# Patient Record
Sex: FEMALE | URBAN - METROPOLITAN AREA
[De-identification: names, ages, dates, MRNs, and addresses within clinical notes are randomized per-mention and may not be internally consistent; named-entity substitution may affect disease eponyms.]

---

## 2023-03-26 ENCOUNTER — ATHLETIC TRAINING (OUTPATIENT)
Dept: SPORTS MEDICINE | Facility: OTHER | Age: 18
End: 2023-03-26

## 2023-03-26 DIAGNOSIS — S76.012A STRAIN OF GLUTEUS MEDIUS, LEFT, INITIAL ENCOUNTER: ICD-10-CM

## 2023-03-26 DIAGNOSIS — M25.552 LEFT HIP PAIN: Primary | ICD-10-CM

## 2023-03-27 ENCOUNTER — ATHLETIC TRAINING (OUTPATIENT)
Dept: SPORTS MEDICINE | Facility: OTHER | Age: 18
End: 2023-03-27

## 2023-03-27 DIAGNOSIS — M25.552 LEFT HIP PAIN: Primary | ICD-10-CM

## 2023-03-27 DIAGNOSIS — S76.012A STRAIN OF GLUTEUS MEDIUS, LEFT, INITIAL ENCOUNTER: ICD-10-CM

## 2023-03-28 NOTE — PROGRESS NOTES
Athletic Training Hip/Thigh Evaluation     Name: Nilsa Moreira  Age: 21 y o    School District: [employer]  Sport: [employer]  Date of Assessment: 3/26/2023     Assessment/Plan:      Visit Diagnosis: Left hip pain [M25 552]     Treatment Plan: Decrease Pain, Strengthen    []  Follow-up PRN  []  Follow-up prior to next practice/game for re-evaluation  [x]  Daily treatment/rehab  Progress note expected weekly  Referral:      [x]  Not needed at this time  []  Referred to:      [x]  Coaching staff notified  []  Parent/Guardian Notified      Subjective:     Date of Injury: 3/26/23     Injury occurred during:      [x]  Practice  []  Competition  []  Other:      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  Pt stated during the beginning of todays practice she went for a shot and felt a sharp pain in the lateral aspect of their hip at the end range of internal rotation      Previous History: No pmhx     Reported Symptoms:      [] Pain with rest [] Pressure   [x] Pain with activity [] Burning   [] Pain with stairs [] Weakness   [x] Sharp pain [] Loss of motion   [] Dull pain [] Clicking   [] Felt pop [] Snapping sensation   [] Felt give way [] Radiating pain   [] Grinding          Objective:    Observation:      [x]  No observable findings compared bilaterally     [] Swelling [] Genu recurvatum   [] Deformity [] Genu valgum   [] Ecchymosis [] Genu varus   [] Abnormal gait [] Hip anteversion   [] Atrophy [] Hip retroversion   [] Muscle spasm [] Patella abnormality   [] Spine curvature          Palpation: No TTP     Active Range of Motion:        Full  ROM Limited  ROM Pain  with  ROM No  Motion   Hip Flexion  [x] [] [] []   Hip Extension [x] [] [] []   Hip Abduction [x] [] [x] []   Hip Adduction [x] [] [] []   Hip Internal Rotation [x] [] [x] []   Hip External Rotation [x] [] [x] []   Knee Flexion [x] [] [] []   Knee Extension [x] [] [] []      Manual Muscle Tests:      Not performed [] 5 4+ 4 4- 3 or  Under   Hip Flexion  [x] [] [] [] []   Hip Extension [x] [] [] [] []   Hip Abduction [x] [] [] [] []   Hip Adduction [x] [] [] [] []   Hip Internal Rotation [x] [] [] [] []   Hip External Rotation [x] [] [] [] []   Knee Flexion [x] [] [] [] []   Knee Extension [x] [] [] [] []      Special Tests:        (+)  Tightness (+)  Pain (-)  WNL Not  Tested   Fulcrum [] [] [] [x]   Ely’s [] [] [] [x]   Mary Kate Otero [] [] [] [x]   Donovan (Modified Mary Kate Otero) [] [] [] [x]   Catherine Hernandez []  [] [x] []   Piriformis [] [] [] [x]   RANDAL [] [] [] [x]   FADIR [] [] [] [x]   SI Compression/Distraction [] [] [] [x]     (+)  Clicking (+)  Pain (-)  WNL Not  Tested   Hip Scour [] [] [x] []     (+)  POS   (-)  WNL Not  Tested   Long Sit Test [] Leg  Discrepancy [] [x]   Trendelenberg's  [] Pelvic  Drop [] [x]       Treatment Log:     Date:     Playing Status:           Exercise/Treatment

## 2023-03-28 NOTE — PROGRESS NOTES
Athletic Training Progress Note    Name: Barbara May  Age: 21 y o  Assessment/Plan:     Visit Diagnosis: Left hip pain [M25 552]    Treatment Plan: Strengthen, decrease pain    []  Follow-up PRN  []  Follow-up prior to next practice/game for re-evaluation  [x]  Daily treatment/rehab  Progress note expected weekly  Subjective: Pt reported to the [facility] today to cont treatment for their left hip  Pt stated it feels a bit better than the day prior  Pt stated they only feel the discomfort at end range of motion      Objective:   See treatment log below    Treatment Log:     Date: 3/27/23       Playing Status: Full Go               Exercise/Treatment        Hip hikes 2x20       Glute bridge w march 3x20       Heel taps red box 3x10       normatec 10min

## 2023-10-11 ENCOUNTER — ATHLETIC TRAINING (OUTPATIENT)
Dept: SPORTS MEDICINE | Facility: OTHER | Age: 18
End: 2023-10-11

## 2023-10-11 DIAGNOSIS — S76.012A STRAIN OF LEFT HIP ADDUCTOR MUSCLE, INITIAL ENCOUNTER: Primary | ICD-10-CM

## 2023-10-12 ENCOUNTER — ATHLETIC TRAINING (OUTPATIENT)
Dept: SPORTS MEDICINE | Facility: OTHER | Age: 18
End: 2023-10-12

## 2023-10-12 DIAGNOSIS — S76.012A STRAIN OF LEFT HIP ADDUCTOR MUSCLE, INITIAL ENCOUNTER: Primary | ICD-10-CM

## 2023-10-12 NOTE — PROGRESS NOTES
10/11  Pt felt that her Left adductor started to tighten up during the game. She completed stretches on her own and continued to play.  This may be an issue later in the season or during ice hockey to be aware of.  EVELYNE ATC

## 2023-10-13 NOTE — PROGRESS NOTES
Athletic Training Hip/Thigh Evaluation     Name: Nida Russell  Age: 21 y.o.   School District: [employer]  Sport: SHADOW  Date of Assessment: 10/12/2023     Assessment/Plan:      Visit Diagnosis: Strain of left hip adductor muscle, initial encounter [Y17.716Z]     Treatment Plan:     []  Follow-up PRN. [x]  Follow-up prior to next practice/game for re-evaluation. [x]  Daily treatment/rehab. Progress note expected weekly.       Referral:      []  Not needed at this time  []  Referred to:      [x]  Coaching staff notified  []  Parent/Guardian Notified      Subjective:     Date of Injury: 10/12/23     Injury occurred during:      []  Practice  [x]  Competition  []  Other:      Mechanism: Over stretching left leg when slipping during field hockey game    Previous History: None     Reported Symptoms:      [] Pain with rest [] Pressure   [] Pain with activity [] Burning   [] Pain with stairs [] Weakness   [] Sharp pain [] Loss of motion   [x] Dull pain [] Clicking   [] Felt pop [] Snapping sensation   [] Felt give way [] Radiating pain   [] Grinding          Objective:    Observation:      [x]  No observable findings compared bilaterally     [] Swelling [] Genu recurvatum   [] Deformity [] Genu valgum   [] Ecchymosis [] Genu varus   [] Abnormal gait [] Hip anteversion   [] Atrophy [] Hip retroversion   [] Muscle spasm [] Patella abnormality   [] Spine curvature          Palpation: Some TTP in "Groin"     Active Range of Motion:        Full  ROM Limited  ROM Pain  with  ROM No  Motion   Hip Flexion  [x] [] [] []   Hip Extension [x] [] [] []   Hip Abduction [x] [] [] []   Hip Adduction [x] [] [x] []   Hip Internal Rotation [x] [] [] []   Hip External Rotation [x] [] [] []   Knee Flexion [x] [] [] []   Knee Extension [x] [] [] []      Manual Muscle Tests:      Not performed []                     5 4+ 4 4- 3 or  Under   Hip Flexion  [x] [] [] [] []   Hip Extension [x] [] [] [] []   Hip Abduction [x] [] [] [] []   Hip Adduction [x] [] [] [] []   Hip Internal Rotation [x] [] [] [] []   Hip External Rotation [x] [] [] [] []   Knee Flexion [x] [] [] [] []   Knee Extension [x] [] [] [] []      Special Tests:        (+)  Tightness (+)  Pain (-)  WNL Not  Tested   Fulcrum [] [] [] []   Ely’s [] [] [] []   Corry Citrus [] [] [] []   Donovan (Modified Corry Citrus) [] [] [] []   Delford List []  [] [] []   Piriformis [] [] [] []   RANDAL [] [] [] []   FADIR [] [] [] []   SI Compression/Distraction [] [] [] []     (+)  Clicking (+)  Pain (-)  WNL Not  Tested   Hip Scour [] [] [] []     (+)  POS   (-)  WNL Not  Tested   Long Sit Test [] Leg  Discrepancy [] []   Trendelenberg's  [] Pelvic  Drop [] []       Treatment Log:  Pt has the goal of practicing full today. She states she has had previous strains of her lower extremity that tend to go away in coming days. She was given muscle activation exercises to reduce discomfort. She was put in hip spica during practice today. After practice she stated that she felt no pain.   Date:     Playing Status:           Exercise/Treatment

## 2023-11-12 ENCOUNTER — ATHLETIC TRAINING (OUTPATIENT)
Dept: SPORTS MEDICINE | Facility: OTHER | Age: 18
End: 2023-11-12

## 2023-11-12 DIAGNOSIS — M25.471 RIGHT ANKLE EFFUSION: Primary | ICD-10-CM

## 2023-11-12 NOTE — PROGRESS NOTES
Athletic Training Foot/Ankle Evaluation    Name: Shruthi Brooks  Age: 19 y.o.   School District: [employer]. Sport: [employer]  Date of Assessment: 11/12/2023    Assessment/Plan:     Visit Diagnosis: Right ankle effusion [M25.471]    Treatment Plan: reduce swelling. []  Follow-up PRN. []  Follow-up prior to next practice/game for re-evaluation. [x]  Daily treatment/rehab. Progress note expected weekly. Referral:     [x]  Not needed at this time  []  Referred to:     []  Coaching staff notified  []  Parent/Guardian Notified    Subjective:    Date of Injury: Concetta Leos reports intermittent pain in her R ankle. It does not follow any pattern. She has had the pain since the end of field hockey season. Injury occurred during:     [x]  Practice  []  Competition  []  Other:     Mechanism: insidious    Previous History: Mild unreported sprain 2 weeks ago    Reported Symptoms:     [] Felt pop [] Weakness   [] Cracking or snapping [] Grinding   [] Twisted [] Sharp pain   [x] Pain with rest [] Burning   [x] Pain with activity [] Dull or achy   [] Pain with stairs [] Felt give way   [] Numbness or tingling [] Loss of motion     Objective:    Report to AT when there is pain next week.     Observation:     []  No observable findings compared bilaterally    [x] Swelling [] Callous or blister   [] Ecchymosis [] Nail abnormality   [] Redness [] Ingrown nail   [] Deformity [] Bunion formation   [] Abnormal gait [] Pes planus   [] Pitting edema [] Pes cavus   [] Open wound [] Atrophy     Palpation: no palpable pain or discomfort    Active Range of Motion:      Full  ROM Limited  ROM Pain  with  ROM No  Motion   Dorsiflexion [x] [] [] []   Plantarflexion [x] [] [] []   Inversion [x] [] [] []   Eversion [x] [] [] []   Great Toe Flexion [x] [] [] []   Great Toe Extension [x] [] [] []   Toe Flexion [x] [] [] []   Toe Extension [x] [] [] []     Manual Muscle Tests:     Not performed []             5 4+ 4 4- 3 or  Under Dorsiflexion [x] [] [] [] []   Plantarflexion [x] [] [] [] []   Inversion [x] [] [] [] []   Eversion [x] [] [] [] []   Great Toe Flexion [x] [] [] [] []   Great Toe Extension [x] [] [] [] []   Toe Flexion [x] [] [] [] []   Toe Extension [x] [] [] [] []     Special Tests:      (+)  Laxity (+)  Pain (-)  WNL Not  Tested   Bump [] [] [x] []   Squeeze [] [] [] [x]   Percussion [] [] [x] []   Tuning Fork [] [] [] [x]   Anterior Drawer [] [] [x] []   Posterior Drawer [] [] [x] []   Talar Tilt - Inversion [] [] [x] []   Talar Tilt - Eversion [] [] [x] []   Kleiger [] [] [] [x]   Toe Compression [] [] [x] []   Toe Distraction [] [] [] [x]   MTP Valgus [] [] [] [x]   MTP Varus [] [] [] [x]   Intermetatarsal Glide [] [] [] [x]   Tarsometatarsal Glide [] [] [] [x]   Tinel's [] [] [] [x]   Impingement Sign [] [] [x] []   Clemons's (Achilles) [] [] [x] []   Mary's Sign (DVT) [] [] [] [x]   Interdigital Neuroma [] [] [] [x]   Navicular Drop [] [] [] [x]     Treatment Log:     Date:    Playing Status:        Exercise/Treatment    Issued compression sleeve

## 2024-02-18 ENCOUNTER — ATHLETIC TRAINING (OUTPATIENT)
Dept: SPORTS MEDICINE | Facility: OTHER | Age: 19
End: 2024-02-18

## 2024-02-18 DIAGNOSIS — M25.871 SESAMOIDITIS OF RIGHT FOOT: Primary | ICD-10-CM

## 2024-02-19 NOTE — PROGRESS NOTES
Athletic Training Foot/Ankle Evaluation    Name: Dalia Cooper  Age: 18 y.o.   School District: [employer]  Sport: Women's Ice Hockey  Date of Assessment: 2/18/2024    Assessment/Plan:     Visit Diagnosis: Sesamoiditis of right foot [M25.871]    Treatment Plan: Decrease pain/ imflammation    []  Follow-up PRN.   []  Follow-up prior to next practice/game for re-evaluation.  [x]  Daily treatment/rehab. Progress note expected weekly.     Referral:     [x]  Not needed at this time  []  Referred to:     [x]  Coaching staff notified  []  Parent/Guardian Notified    Subjective:    Date of Injury: ~1/20/24    Injury occurred during:     []  Practice  []  Competition  [x]  Other:     Mechanism: insidious, initial thought to be from worn insoles in skate exposing screw top for blade housing.    Previous History: ankle effusion/sprain from fall 2023    Reported Symptoms:     [] Felt pop [] Weakness   [] Cracking or snapping [] Grinding   [] Twisted [x] Sharp pain   [] Pain with rest [] Burning   [x] Pain with activity [] Dull or achy   [x] Pain with stairs [] Felt give way   [] Numbness or tingling [] Loss of motion     Objective:    Observation:     []  No observable findings compared bilaterally    [] Swelling [x] Callous or blister   [] Ecchymosis [] Nail abnormality   [] Redness [] Ingrown nail   [] Deformity [] Bunion formation   [x] Abnormal gait [] Pes planus   [] Pitting edema [] Pes cavus   [] Open wound [] Atrophy     Palpation: TTP on sesamoid of right hallux    Active Range of Motion:      Full  ROM Limited  ROM Pain  with  ROM No  Motion   Dorsiflexion [x] [] [] []   Plantarflexion [x] [] [] []   Inversion [x] [] [] []   Eversion [x] [] [] []   Great Toe Flexion [x] [] [] []   Great Toe Extension [x] [] [] []   Toe Flexion [x] [] [] []   Toe Extension [x] [] [] []     Manual Muscle Tests:     Not performed [x]             5 4+ 4 4- 3 or  Under   Dorsiflexion [] [] [] [] []   Plantarflexion [] [] [] [] []    Inversion [] [] [] [] []   Eversion [] [] [] [] []   Great Toe Flexion [] [] [] [] []   Great Toe Extension [] [] [] [] []   Toe Flexion [] [] [] [] []   Toe Extension [] [] [] [] []     Special Tests:      (+)  Laxity (+)  Pain (-)  WNL Not  Tested   Bump [] [] [] [x]   Squeeze [] [] [] [x]   Percussion [] [] [] [x]   Tuning Fork [] [] [] [x]   Anterior Drawer [] [] [] [x]   Posterior Drawer [] [] [] [x]   Talar Tilt - Inversion [] [] [] [x]   Talar Tilt - Eversion [] [] [] [x]   Kleiger [] [] [] [x]   Toe Compression [] [] [] [x]   Toe Distraction [] [] [] [x]   MTP Valgus [] [] [] [x]   MTP Varus [] [] [] [x]   Intermetatarsal Glide [] [] [] [x]   Tarsometatarsal Glide [] [] [] [x]   Tinel's [] [] [] [x]   Impingement Sign [] [] [] [x]   Clemons's (Achilles) [] [] [] [x]   Mary's Sign (DVT) [] [] [] [x]   Interdigital Neuroma [] [] [] [x]   Navicular Drop [] [] [] [x]     Treatment Log:     Date: 2/18/24   Playing Status: As tolerated       Exercise/Treatment    Plantar fascia mob with ball 3min   Toe yoga 50x   CUS completed                                   Pt instructed to wear cushioned shoes throughout the day as it is becoming uncomfortable walking due to pain. Pt adding more cushion to skate  for practice/games. Pt will cont come in for treatment.  CY LAT ATC

## 2024-03-19 ENCOUNTER — ATHLETIC TRAINING (OUTPATIENT)
Dept: SPORTS MEDICINE | Facility: OTHER | Age: 19
End: 2024-03-19

## 2024-03-19 DIAGNOSIS — M25.871 SESAMOIDITIS OF RIGHT FOOT: Primary | ICD-10-CM

## 2024-03-21 NOTE — PROGRESS NOTES
Athletic Training Progress Note    Name: Dalia Cooper  Age: 18 y.o.     Assessment/Plan:     Visit Diagnosis: Sesamoiditis of right foot [M25.871]    Treatment Plan: Decrease Pain PRN    [x]  Follow-up PRN.   []  Follow-up prior to next practice/game for re-evaluation.  []  Daily treatment/rehab. Progress note expected weekly.     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 Pt reported to follow up and continue treatment on their right foot. Pt reports pain/discomfort has significantly decreased since ice hockey season has ended. Pt has not skated since 2/21/24. Pt reports occassional discomfort during field hockey practice and then lingers to the next day. Pt reports PQ of 2/10 while it bothers them and currently is 0/10. Pt is able to lift normally.    Objective:   See treatment log below    Treatment Log:       Date: 3/19/24 2/18/24   Playing Status: As Tolerated As tolerated        Exercise/Treatment     Plantar fascia mob with ball 3min 3min   Toe yoga 50x 50x   CUS completed completed   Towel scrunches completed    [place] pickup 1x/jar                                  Pt instructed to wear cushioned shoes throughout the day as it is becoming uncomfortable walking due to pain. Pt will come in PRN for treatment.  CY LAT ATC

## 2024-04-13 ENCOUNTER — ATHLETIC TRAINING (OUTPATIENT)
Dept: SPORTS MEDICINE | Facility: OTHER | Age: 19
End: 2024-04-13

## 2024-04-13 DIAGNOSIS — M25.871 ANKLE IMPINGEMENT SYNDROME, RIGHT: Primary | ICD-10-CM

## 2024-04-13 NOTE — PROGRESS NOTES
4/13  A: Right ankle impingement  S: Pt states that her Right ankle is painful and sore after beginning field hockey in the spring. She sprain her ankle in the Fall.  O: Anterior pain that goes around her ankle  P: Pt would like to start treatment for ankle impingement.  EVELYNE ATC

## 2024-04-18 ENCOUNTER — ATHLETIC TRAINING (OUTPATIENT)
Dept: SPORTS MEDICINE | Facility: OTHER | Age: 19
End: 2024-04-18

## 2024-04-18 DIAGNOSIS — M25.571 RIGHT ANKLE PAIN, UNSPECIFIED CHRONICITY: Primary | ICD-10-CM

## 2024-04-18 NOTE — PROGRESS NOTES
Athletic Training Foot/Ankle Evaluation    Name: Dalia Cooper  Age: 18 y.o.   School District: [employer]   Sport: Field Hockey  Date of Assessment: 4/18/2024    Assessment/Plan:     Visit Diagnosis: Right ankle pain, unspecified chronicity [M25.571]    Treatment Plan: General ankle and lower leg strengthening    []  Follow-up PRN.   []  Follow-up prior to next practice/game for re-evaluation.  [x]  Daily treatment/rehab. Progress note expected weekly.     Referral:     [x]  Not needed at this time  []  Referred to:     []  Coaching staff notified  []  Parent/Guardian Notified    Subjective:    Date of Injury: 4/17/24    Injury occurred during:     [x]  Practice  []  Competition  []  Other:     Mechanism: Athlete said that last year during a field hockey practice her foot got caught in the turf and she rolled her ankle.     Previous History: N/A    Reported Symptoms:     [] Felt pop [] Weakness   [] Cracking or snapping [] Grinding   [] Twisted [x] Sharp pain   [] Pain with rest [] Burning   [x] Pain with activity [] Dull or achy   [] Pain with stairs [] Felt give way   [] Numbness or tingling [] Loss of motion     Objective:    Observation:     [x]  No observable findings compared bilaterally    [] Swelling [] Callous or blister   [] Ecchymosis [] Nail abnormality   [] Redness [] Ingrown nail   [] Deformity [] Bunion formation   [] Abnormal gait [] Pes planus   [] Pitting edema [] Pes cavus   [] Open wound [] Atrophy     Palpation: Athlete was a little tender to palpate on the tibialis anterior tendon. Athlete was tender to palpate on the medial side of her right ankle. Tender along the posterior tibialis, flexor retinaculum, and retrocalcaneal bursa. Athlete was not tender anywhere else.    Active Range of Motion:      Full  ROM Limited  ROM Pain  with  ROM No  Motion   Dorsiflexion [x] [] [] []   Plantarflexion [x] [] [x] []   Inversion [x] [] [] []   Eversion [x] [] [] []   Great Toe Flexion [x] [] []  []   Great Toe Extension [x] [] [] []   Toe Flexion [x] [] [] []   Toe Extension [x] [] [] []     Manual Muscle Tests:     Not performed []             5 4+ 4 4- 3 or  Under   Dorsiflexion [x] [] [] [] []   Plantarflexion [x] [] [] [] []   Inversion [x] [] [] [] []   Eversion [x] [] [] [] []   Great Toe Flexion [] [] [] [] []   Great Toe Extension [] [] [] [] []   Toe Flexion [] [] [] [] []   Toe Extension [] [] [] [] []     Special Tests:      (+)  Laxity (+)  Pain (-)  WNL Not  Tested   Bump [] [] [x] []   Squeeze [] [] [x] []   Percussion [] [] [] [x]   Tuning Fork [] [] [] [x]   Anterior Drawer [] [] [x] []   Posterior Drawer [] [] [] [x]   Talar Tilt - Inversion [] [] [x] []   Talar Tilt - Eversion [] [] [x] []   Kleiger [] [] [x] []   Toe Compression [] [] [] [x]   Toe Distraction [] [] [] [x]   MTP Valgus [] [] [] [x]   MTP Varus [] [] [] [x]   Intermetatarsal Glide [] [] [] [x]   Tarsometatarsal Glide [] [] [] [x]   Tinel's [] [] [x] []   Impingement Sign [] [] [] [x]   Clemons's (Achilles) [] [] [] [x]   Mary's Sign (DVT) [] [] [] [x]   Interdigital Neuroma [] [] [] [x]   Navicular Drop [] [] [] [x]     Treatment Log:     Date: 4/18/24   Playing Status: Full Participation       Exercise/Treatment    Four [address] Ankle Blue Band 2x15   Toe Walk/Heel Walk 3x around clinic   Calf Raises 2x15 B/L   SL Airex Balance w/ Ball Catch [id number]   Forced Plantarflexion 4x15                           Athlete will come in 2-3x a week to continue strengthening her ankle and lower leg

## 2024-04-21 ENCOUNTER — ATHLETIC TRAINING (OUTPATIENT)
Dept: SPORTS MEDICINE | Facility: OTHER | Age: 19
End: 2024-04-21

## 2024-04-21 DIAGNOSIS — M62.838 TRAPEZIUS MUSCLE SPASM: Primary | ICD-10-CM

## 2024-04-21 NOTE — PROGRESS NOTES
Athletic Training Initial Evaluation    Name: Dalia Cooper  Age: 18 y.o.   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 Pt reported to the [facility] this afternoon with a c/o pain in her right upper trap/neck. Pain/discomfort is insidious in nature as the patient does not recall what may have caused the discomfort. Pt thinks they might have slept on it wrong. Pt states it hurts with walking and looking side to side.      Objective:   Observed apprehension when turning their head side to side and up and down  TTP in upper trap.        Treatment Log       Date: 4/21/24   Playing Status Full Play - No Restrictions   Tolerated Treatment Well       Exercise/Treatment    MHP 10min   Cupping therapy 5mins gliding                                     Pt will f/u PRN for further treatment should their discomfort not subside.  CY LAT ATC

## 2024-04-22 ENCOUNTER — ATHLETIC TRAINING (OUTPATIENT)
Dept: SPORTS MEDICINE | Facility: OTHER | Age: 19
End: 2024-04-22

## 2024-04-22 DIAGNOSIS — M62.838 TRAPEZIUS MUSCLE SPASM: Primary | ICD-10-CM

## 2024-04-23 NOTE — PROGRESS NOTES
Athletic Training Initial Evaluation     Name: Dalia Cooper  Age: 18 y.o.   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 Pt reported to the [facility] this afternoon with a c/o pain in her right upper trap/neck. Pain/discomfort is insidious in nature as the patient does not recall what may have caused the discomfort. Pt thinks they might have slept on it wrong. Pt states it hurts with walking and looking side to side.        Objective:   Observed apprehension when turning their head side to side and up and down  TTP in upper trap.           Treatment Log     Date: 4/21/24 4/22/24   Playing Status Full Play - No Restrictions    Tolerated Treatment Well           Exercise/Treatment      MHP 10min 10 mins   Cupping therapy 5mins gliding    Traction   X                                                       Pt will f/u PRN for further treatment should their discomfort not subside.  CY LAT ATC    4/22:  PT states that they are feeling much better compared to her previous days.  CM

## 2024-09-01 ENCOUNTER — ATHLETIC TRAINING (OUTPATIENT)
Dept: SPORTS MEDICINE | Facility: OTHER | Age: 19
End: 2024-09-01

## 2024-09-01 DIAGNOSIS — G89.29 CHRONIC PAIN OF RIGHT ANKLE: ICD-10-CM

## 2024-09-01 DIAGNOSIS — M25.571 CHRONIC PAIN OF RIGHT ANKLE: ICD-10-CM

## 2024-09-01 DIAGNOSIS — S76.112A QUADRICEPS MUSCLE STRAIN, LEFT, INITIAL ENCOUNTER: Primary | ICD-10-CM

## 2024-09-01 NOTE — PROGRESS NOTES
9/1/24: pt CC was Left quad & Right ankle, pt had field hockey game yesterday (8/31) and went to get low in a squat and felt pain in her quad, past 2 weeks she's been dealing with a severe case of DOMS and we treated with ultrasound     Right ankle; pt history is end of last season she injured that ankle, then in yesterdays game she hyper plantar flexed and felt pain on the medial posterior ankle, more of a sore/achy type of pain 3/10 pain scale   (-) bump, pots compression, anterior drawer, cotton, medial glide   Tx was ultrasound as well

## 2024-09-17 ENCOUNTER — ATHLETIC TRAINING (OUTPATIENT)
Dept: SPORTS MEDICINE | Facility: OTHER | Age: 19
End: 2024-09-17

## 2024-09-17 DIAGNOSIS — S90.32XA CONTUSION OF LEFT FOOT, INITIAL ENCOUNTER: Primary | ICD-10-CM

## 2024-09-18 NOTE — PROGRESS NOTES
Athletic Training Evaluation    Name: Dalia Cooper  Age: 18 y.o.   School District: [employer]  Sport: Field Hockey  Date of Assessment: 9/17/2024    Assessment/Plan:     Visit Diagnosis: Contusion of left foot, initial encounter [S90.32XA]    Treatment Plan:     []  Follow-up PRN.   [x]  Follow-up prior to next practice/game for re-evaluation.  []  Daily treatment/rehab. Progress note expected weekly.     Referral:     []  Not needed at this time  []  Referred to:     [x]  Coaching staff notified  []  Parent/Guardian Notified    Subjective: Pt comes limping into the [facility] around 1pm stating that she was hit in the foot with a field hockey ball during her morning practice. She was able to complete and finish her practice in the morning and was able to go throughout her day. During her class she attempted to stand up and was unable to put weight on her left leg.    Objective: Some swelling seen around the area that was hit. Weight bearing causes great amounts of pain. ROM causes pain and is not full strength. Fracture tests were negative but we will re-evaluate later in the day to make sure she does not need an x-ray.   Pt has a game tomorrow and wants to participate. We developed a plan of care for her to hopefully participate in her game.  She was put on crutches to avoid weight bearing.  She will check in tonight and tomorrow    Treatment Log:    Date: 9/17   Playing Status:        Exercise/Treatment    Ice 5 minutes